# Patient Record
Sex: MALE | Race: OTHER | NOT HISPANIC OR LATINO | URBAN - METROPOLITAN AREA
[De-identification: names, ages, dates, MRNs, and addresses within clinical notes are randomized per-mention and may not be internally consistent; named-entity substitution may affect disease eponyms.]

---

## 2018-09-26 ENCOUNTER — EMERGENCY (EMERGENCY)
Facility: HOSPITAL | Age: 28
LOS: 1 days | Discharge: HOME | End: 2018-09-26
Admitting: EMERGENCY MEDICINE
Payer: OTHER MISCELLANEOUS

## 2018-09-26 VITALS
OXYGEN SATURATION: 98 % | RESPIRATION RATE: 17 BRPM | DIASTOLIC BLOOD PRESSURE: 85 MMHG | SYSTOLIC BLOOD PRESSURE: 135 MMHG | HEART RATE: 60 BPM

## 2018-09-26 VITALS
SYSTOLIC BLOOD PRESSURE: 145 MMHG | RESPIRATION RATE: 18 BRPM | TEMPERATURE: 98 F | HEART RATE: 63 BPM | DIASTOLIC BLOOD PRESSURE: 91 MMHG | OXYGEN SATURATION: 97 %

## 2018-09-26 DIAGNOSIS — S90.01XA CONTUSION OF RIGHT ANKLE, INITIAL ENCOUNTER: ICD-10-CM
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
## 2018-09-26 DIAGNOSIS — M25.571 PAIN IN RIGHT ANKLE AND JOINTS OF RIGHT FOOT: ICD-10-CM
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
## 2018-09-26 DIAGNOSIS — Z88.0 ALLERGY STATUS TO PENICILLIN: ICD-10-CM
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
## 2018-09-26 PROBLEM — Z00.00 ENCOUNTER FOR PREVENTIVE HEALTH EXAMINATION: Status: ACTIVE | Noted: 2018-09-26

## 2018-09-26 PROCEDURE — 99283 EMERGENCY DEPT VISIT LOW MDM: CPT

## 2018-09-26 RX ORDER — IBUPROFEN 200 MG
600 TABLET ORAL ONCE
Qty: 0 | Refills: 0 | Status: COMPLETED | OUTPATIENT
Start: 2018-09-26 | End: 2018-09-26

## 2018-09-26 RX ADMIN — Medication 600 MILLIGRAM(S): at 14:31

## 2018-09-26 NOTE — ED PROVIDER NOTE - MEDICAL DECISION MAKING DETAILS
Clinical presentation c/w likely mild right ankle contusion. Ambulates well and without difficulty. Motrin 600mg PO administered, ACE wrap, supportive care. F/U with Ortho in 3-5 days if no improvement.

## 2018-09-26 NOTE — ED ADULT NURSE NOTE - NSIMPLEMENTINTERV_GEN_ALL_ED
Implemented All Universal Safety Interventions:  Brilliant to call system. Call bell, personal items and telephone within reach. Instruct patient to call for assistance. Room bathroom lighting operational. Non-slip footwear when patient is off stretcher. Physically safe environment: no spills, clutter or unnecessary equipment. Stretcher in lowest position, wheels locked, appropriate side rails in place.
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
## 2018-09-26 NOTE — ED PROVIDER NOTE - CARE PROVIDER_API CALL
Krystian Laughlin), Orthopaedic Surgery  [address]  [address] [place] [address]  Phone: [phone]  Fax: [phone]

## 2018-09-26 NOTE — ED PROVIDER NOTE - MUSCULOSKELETAL MINIMAL EXAM
Mild tenderness to anterior aspect of right ankle. No swelling, ecchymosis, deformity or crepitus. FROM. Distal NV status intact. Ambulates well.

## 2018-09-26 NOTE — ED ADULT NURSE NOTE - CHPI ED NUR SYMPTOMS NEG
no vomiting/no pain/no decreased eating/drinking/no nausea/no fever/no chills/no weakness/no tingling/no dizziness
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
## 2018-10-01 ENCOUNTER — APPOINTMENT (OUTPATIENT)
Dept: ORTHOPEDIC SURGERY | Facility: CLINIC | Age: 28
End: 2018-10-01

## 2023-01-30 NOTE — ED ADULT NURSE NOTE - NS ED NURSE LEVEL OF CONSCIOUSNESS SPEECH
Speaking Coherently

## 2023-06-11 NOTE — ED ADULT NURSE NOTE - INTEGUMENTARY WDL
LOIDAFLOWER KLINE  66y  Male  ***My note supersedes ALL resident notes that I sign.  My corrections for their notes are in my note.***    I can be reached directly on [employer] [id number]. My office number is [phone]. My personal cell number is [phone].    INTERVAL EVENTS: Here for f/u of CHF. Voice is soft post-extub. Pt still confused - O x1. No events over night. Pt says he has less pain in extremities.    T(F): 96.9 (06-11-23 @ 04:47), Max: 97.6 (06-10-23 @ 12:22)  HR: 103 (06-11-23 @ 04:47) (103 - 104)  BP: 104/72 (06-11-23 @ 04:47) (93/59 - 104/72)  RR: 18 (06-11-23 @ 04:47) (18 - 18)  SpO2: 100% (06-11-23 @ 04:47) (100% - 100%)    06-10-23 @ 07:01  -  06-11-23 @ 07:00  --------------------------------------------------------  IN: 0 mL / OUT: 600 mL / NET: -600 mL    06-11-23 @ 07:01  -  06-11-23 @ 12:19  --------------------------------------------------------  IN: 0 mL / OUT: 200 mL / NET: -200 mL    Gen: NAD;   skin: numerous excoriations on arms/hands and left leg (pt tends to pick at skin)  HEENT: PERRL, mouth clr, nose clr  Neck: no nodes, no JVD, thyroid nl  chest: lt AICD  lungs: clr  hrt: s1 s2 reg; tachy; 2/6 sys murmur apex  abd: soft, NT/ND, no HS megaly  ext: no edema, no c/c  rt AKA - stump OK  lt leg w/ signif flexion contracture (prevents standing); has small ulcers on dorsal base of 1st toe; and small ulcers on tips of toes 2 and 4  neuro: aa, confused (not at baseline) ox1, cn grossly intact, can move arms fine; rt aka; lt leg w/ flex contract    LABS:                      11.0    (    78.2   8.20  )-----------( ---------      151      ( 10 Nael 2023 12:18 )             39.1    (    33.3     CAPILLARY BLOOD GLUCOSE  POCT Blood Glucose.: 143 (06-11-23 @ 11:55)  POCT Blood Glucose.: 138 (06-11-23 @ 07:49)  POCT Blood Glucose.: 115 (06-10-23 @ 21:06)  POCT Blood Glucose.: 146 (06-10-23 @ 16:50)  POCT Blood Glucose.: 149 (06-10-23 @ 11:34)  POCT Blood Glucose.: 187 (06-10-23 @ 07:43)  POCT Blood Glucose.: 224 (06-09-23 @ 21:18)  POCT Blood Glucose.: 230 (06-09-23 @ 17:16)    Culture - Blood (collected 06-06-23 @ 11:46)  Source: .Blood None  Preliminary Report (06-07-23 @ 22:01):    No growth to date.    Culture - Urine (collected 06-05-23 @ 12:15)  Source: Catheterized Catheterized  Final Report (06-08-23 @ 10:34):    >100,000 CFU/ml Klebsiella pneumoniae ESBL Multiple Morphological Strains  Organism: Klebsiella pneumoniae ESBL  Klebsiella pneumoniae ESBL (06-08-23 @ 10:34)  Organism: Klebsiella pneumoniae ESBL (06-08-23 @ 10:34)      Method Type: YOLIE      -  Amikacin: S <=16      -  Amoxicillin/Clavulanic Acid: S <=8/4 Consider reserving for cystitis when ampicillin/sulbactam is resistant      -  Ampicillin: R >16 These ampicillin results predict results for amoxicillin      -  Ampicillin/Sulbactam: R >16/8 Enterobacter, Klebsiella aerogenes, Citrobacter, and Serratia may develop resistance during prolonged therapy (3-4 days)      -  Aztreonam: R >16      -  Cefazolin: R >16 For uncomplicated UTI with K. pneumoniae, E. coli, or P. mirablis: YOLIE <=16 is sensitive and YOLIE >=32 is resistant. This also predicts results for oral agents cefaclor, cefdinir, cefpodoxime, cefprozil, cefuroxime axetil, cephalexin and locarbef for uncomplicated UTI. Note that some isolates may be susceptible to these agents while testing resistant to cefazolin.      -  Cefepime: R >16      -  Ceftriaxone: R >32 Enterobacter, Klebsiella aerogenes, Citrobacter, and Serratia may develop resistance during prolonged therapy      -  Cefuroxime: R >16      -  Ciprofloxacin: R >2      -  Ertapenem: S <=0.5      -  Gentamicin: R >8      -  Imipenem: S <=1      -  Levofloxacin: R 4      -  Meropenem: S <=1      -  Nitrofurantoin: S <=32 Should not be used to treat pyelonephritis      -  Piperacillin/Tazobactam: S <=8      -  Tobramycin: I 8      -  Trimethoprim/Sulfamethoxazole: R >2/38  Organism: Klebsiella pneumoniae ESBL (06-08-23 @ 10:34)      Method Type: YOLIE      -  Amikacin: S <=16      -  Amoxicillin/Clavulanic Acid: S <=8/4 Consider reserving for cystitis when ampicillin/sulbactam is resistant      -  Ampicillin: R >16 These ampicillin results predict results for amoxicillin      -  Ampicillin/Sulbactam: R >16/8 Enterobacter, Klebsiella aerogenes, Citrobacter, and Serratia may develop resistance during prolonged therapy (3-4 days)      -  Aztreonam: R >16      -  Cefazolin: R >16 For uncomplicated UTI with K. pneumoniae, E. coli, or P. mirablis: YOLIE <=16 is sensitive and YOLIE >=32 is resistant. This also predicts results for oral agents cefaclor, cefdinir, cefpodoxime, cefprozil, cefuroxime axetil, cephalexin and locarbef for uncomplicated UTI. Note that some isolates may be susceptible to these agents while testing resistant to cefazolin.      -  Cefepime: R 16      -  Ceftriaxone: R >32 Enterobacter, Klebsiella aerogenes, Citrobacter, and Serratia may develop resistance during prolonged therapy      -  Cefuroxime: R >16      -  Ciprofloxacin: R >2      -  Ertapenem: S <=0.5      -  Gentamicin: R >8      -  Imipenem: S <=1      -  Levofloxacin: R 2      -  Meropenem: S <=1      -  Nitrofurantoin: S <=32 Should not be used to treat pyelonephritis      -  Piperacillin/Tazobactam: S <=8      -  Tobramycin: S 4      -  Trimethoprim/Sulfamethoxazole: R >2/38    RADIOLOGY & ADDITIONAL TESTS:    MEDICATIONS:  meropenem  IVPB 1000 milliGRAM(s) IV Intermittent every 12 hours    acetaminophen     Tablet .. 325 milliGRAM(s) Oral every 4 hours PRN  aspirin enteric coated 81 milliGRAM(s) Oral daily  bacitracin   Ointment 1 Application(s) Topical every 12 hours  chlorhexidine 2% Cloths 1 Application(s) Topical daily  DULoxetine 30 milliGRAM(s) Oral <User Schedule>  folic acid 1 milliGRAM(s) Oral <User Schedule>  gabapentin 100 milliGRAM(s) Oral every 12 hours  heparin   Injectable 5000 Unit(s) SubCutaneous every 12 hours  insulin glargine Injectable (LANTUS) 12 Unit(s) SubCutaneous every morning  insulin lispro (ADMELOG) corrective regimen sliding scale   SubCutaneous three times a day before meals  levothyroxine 25 MICROGram(s) Oral <User Schedule>  levothyroxine 50 MICROGram(s) Oral <User Schedule>  mupirocin 2% Ointment 1 Application(s) Both Nostrils two times a day  pantoprazole    Tablet 40 milliGRAM(s) Oral before breakfast  polyethylene glycol 3350 17 Gram(s) Oral two times a day  prasugrel 10 milliGRAM(s) Oral daily  senna 2 Tablet(s) Oral at bedtime   Color consistent with ethnicity/race, warm, dry intact, resilient.